# Patient Record
Sex: FEMALE | Race: WHITE | NOT HISPANIC OR LATINO | Employment: FULL TIME | URBAN - NONMETROPOLITAN AREA
[De-identification: names, ages, dates, MRNs, and addresses within clinical notes are randomized per-mention and may not be internally consistent; named-entity substitution may affect disease eponyms.]

---

## 2023-04-17 PROBLEM — F33.1 MODERATE EPISODE OF RECURRENT MAJOR DEPRESSIVE DISORDER (MULTI): Status: ACTIVE | Noted: 2023-04-17

## 2023-04-17 PROBLEM — Z97.5 IUD CONTRACEPTION: Status: ACTIVE | Noted: 2023-04-17

## 2023-04-17 PROBLEM — F12.90 CANNABIS USE DISORDER: Status: ACTIVE | Noted: 2023-04-17

## 2023-04-17 PROBLEM — F41.9 ANXIETY, MILD: Status: ACTIVE | Noted: 2023-04-17

## 2023-04-17 PROBLEM — F32.5 DEPRESSION, MAJOR, IN REMISSION (CMS-HCC): Status: ACTIVE | Noted: 2023-04-17

## 2023-04-17 PROBLEM — F31.9 BIPOLAR DEPRESSION (MULTI): Status: ACTIVE | Noted: 2023-04-17

## 2023-04-17 PROBLEM — E55.9 VITAMIN D DEFICIENCY: Status: ACTIVE | Noted: 2023-04-17

## 2023-04-17 RX ORDER — LITHIUM CARBONATE 150 MG/1
1 CAPSULE ORAL 2 TIMES DAILY
COMMUNITY
Start: 2022-09-28 | End: 2023-11-14 | Stop reason: WASHOUT

## 2023-04-17 RX ORDER — BUPROPION HYDROCHLORIDE 150 MG/1
1 TABLET ORAL 2 TIMES DAILY
COMMUNITY
Start: 2022-09-28 | End: 2023-11-14 | Stop reason: WASHOUT

## 2023-04-17 RX ORDER — TRAZODONE HYDROCHLORIDE 150 MG/1
1 TABLET ORAL NIGHTLY
COMMUNITY
Start: 2022-05-03 | End: 2023-11-14 | Stop reason: WASHOUT

## 2023-04-17 RX ORDER — BUSPIRONE HYDROCHLORIDE 30 MG/1
1 TABLET ORAL 2 TIMES DAILY
COMMUNITY
Start: 2022-09-28

## 2023-04-17 RX ORDER — QUETIAPINE FUMARATE 300 MG/1
1 TABLET, FILM COATED ORAL NIGHTLY
COMMUNITY
Start: 2022-09-28 | End: 2023-11-14 | Stop reason: WASHOUT

## 2023-04-17 RX ORDER — ERGOCALCIFEROL 1.25 MG/1
1 CAPSULE ORAL
COMMUNITY
Start: 2022-09-28

## 2023-04-17 NOTE — PROGRESS NOTES
Subjective:    Nadiya Metz is a 22 y.o. female who presents for No chief complaint on file.        HPI:    Who is GYN?    When was last pap?    Does she still have her IUD?  If yes- when was it placed?        What concern/ problem/pain/symptom  brings you here today?      how long has pt had sxs?      describe symptoms-      Any open lesions/blisters?       how often do symptoms occur?      has pt tried anything for current symptoms, including medications (OTC or prescription)  ?        what makes symptoms worse?      has pt been seen recently for this problem ( within past 2-3 weeks) ?    if yes- where?    by who?    what treatment was provided?            Social History     Tobacco Use   Smoking Status Not on file   Smokeless Tobacco Not on file   Vaping Use   Vaping Status Not on file         Review of Systems:      Objective:    There were no vitals filed for this visit.      Patient is alert and oriented x 3 , NAD  HEAD- normocephalic and atraumatic   EYES-conjunctiva-normal, lids - normal  EARS/NOSE- normal external exam   NECK-supple,FROM  CV- RRR without murmur  PULM- CTA bilaterally, normal respiratory effort  RESPIRATORY EFFORT- normal , no retractions or nasal flaring   ABD- normoactive BS's   EXT- no edema,NT  SKIN- no abnormal skin lesions noted  NEURO- no focal deficits  PSYCH- pleasant, normal judgement and insight          Pt has received Gardisil vaccine   12/2022- chalmydia positive,  repeat testing was neg   1/2020 hx gardnerella /BV      Assessment/Plan:    No diagnosis found.    No orders of the defined types were placed in this encounter.            Call if no better or if symptoms worsen

## 2023-04-18 ENCOUNTER — APPOINTMENT (OUTPATIENT)
Dept: PRIMARY CARE | Facility: CLINIC | Age: 23
End: 2023-04-18
Payer: COMMERCIAL

## 2023-04-18 RX ORDER — LINACLOTIDE 145 UG/1
CAPSULE, GELATIN COATED ORAL
COMMUNITY
Start: 2022-09-06 | End: 2023-11-14 | Stop reason: WASHOUT

## 2023-04-18 RX ORDER — PROPRANOLOL HYDROCHLORIDE 10 MG/1
1 TABLET ORAL 2 TIMES DAILY
COMMUNITY
Start: 2022-08-13

## 2023-04-18 RX ORDER — CLONIDINE HYDROCHLORIDE 0.1 MG/1
TABLET ORAL
COMMUNITY
Start: 2022-09-06

## 2023-04-18 RX ORDER — ARIPIPRAZOLE 10 MG/1
TABLET ORAL
COMMUNITY
Start: 2022-09-13 | End: 2023-11-14 | Stop reason: WASHOUT

## 2023-04-18 RX ORDER — HYDROXYZINE PAMOATE 50 MG/1
CAPSULE ORAL
COMMUNITY
Start: 2022-08-16

## 2023-04-18 RX ORDER — VENLAFAXINE HYDROCHLORIDE 37.5 MG/1
CAPSULE, EXTENDED RELEASE ORAL
COMMUNITY
Start: 2022-08-11 | End: 2023-11-14 | Stop reason: WASHOUT

## 2023-04-18 RX ORDER — QUETIAPINE FUMARATE 100 MG/1
TABLET, FILM COATED ORAL
COMMUNITY
Start: 2022-09-13 | End: 2023-11-14 | Stop reason: WASHOUT

## 2023-04-18 RX ORDER — BUPROPION HYDROCHLORIDE 100 MG/1
TABLET, EXTENDED RELEASE ORAL
COMMUNITY
Start: 2022-08-11 | End: 2023-11-14 | Stop reason: WASHOUT

## 2023-04-18 RX ORDER — BUSPIRONE HYDROCHLORIDE 10 MG/1
1 TABLET ORAL 2 TIMES DAILY
COMMUNITY
Start: 2022-08-18 | End: 2023-11-14 | Stop reason: WASHOUT

## 2023-04-18 RX ORDER — MIRTAZAPINE 15 MG/1
TABLET, FILM COATED ORAL
COMMUNITY
Start: 2022-09-14 | End: 2023-11-14 | Stop reason: WASHOUT

## 2023-04-18 NOTE — PROGRESS NOTES
Subjective:     Nadiya Metz is a 22 y.o. female who presents for No chief complaint on file.           HPI:     Who is GYN?     When was last pap?     Does she still have her IUD?  If yes- when was it placed?           What concern/ problem/pain/symptom  brings you here today?        how long has pt had sxs?        describe symptoms-        Any open lesions/blisters?         how often do symptoms occur?        has pt tried anything for current symptoms, including medications (OTC or prescription)  ?          what makes symptoms worse?        has pt been seen recently for this problem ( within past 2-3 weeks) ?     if yes- where?     by who?     what treatment was provided?                 Social History          Tobacco Use   Smoking Status Not on file   Smokeless Tobacco Not on file   Vaping Use   Vaping Status Not on file            Review of Systems:        Objective:     Vitals   There were no vitals filed for this visit.           Patient is alert and oriented x 3 , NAD  HEAD- normocephalic and atraumatic   EYES-conjunctiva-normal, lids - normal  EARS/NOSE- normal external exam   NECK-supple,FROM  CV- RRR without murmur  PULM- CTA bilaterally, normal respiratory effort  RESPIRATORY EFFORT- normal , no retractions or nasal flaring   ABD- normoactive BS's   EXT- no edema,NT  SKIN- no abnormal skin lesions noted  NEURO- no focal deficits  PSYCH- pleasant, normal judgement and insight              Pt has received Gardisil vaccine   12/2022- chalmydia positive,  repeat testing was neg   1/2020 hx gardnerella /BV        Assessment/Plan:     No diagnosis found.     No orders of the defined types were placed in this encounter.                 Call if no better or if symptoms worsen                         Appointment on 4/18/2023

## 2023-04-20 ENCOUNTER — APPOINTMENT (OUTPATIENT)
Dept: PRIMARY CARE | Facility: CLINIC | Age: 23
End: 2023-04-20
Payer: COMMERCIAL

## 2023-04-28 ENCOUNTER — TELEPHONE (OUTPATIENT)
Dept: PRIMARY CARE | Facility: CLINIC | Age: 23
End: 2023-04-28
Payer: COMMERCIAL

## 2023-04-28 NOTE — TELEPHONE ENCOUNTER
4/28/23    Dear Nadiya,      We are concerned about your recent missed appointments. Your health care is very important to us. Missing appointments or rescheduling on the same day impacts your medical care as the patient as well as the medical care of the other patients.     We ask that you give us at least a 24 hour notice if you need to cancel an appointment so that we can offer that appointment to another patient who needs to see the doctor. We also ask that you arrive prior to your scheduled appointment time. If you continue to cancel the same day or no show for appointments you will be dismissed from the practice.     We strive to provide the best medical care to all our patients and we hope we will be able to continue to serve as your physicians.    Sincerely,      [facility]

## 2023-11-12 PROBLEM — F15.10 METHAMPHETAMINE ABUSE (MULTI): Chronic | Status: ACTIVE | Noted: 2022-08-12

## 2023-11-12 PROBLEM — F41.9 ANXIETY, MILD: Chronic | Status: ACTIVE | Noted: 2023-04-17

## 2023-11-12 PROBLEM — F31.9 BIPOLAR DEPRESSION (MULTI): Chronic | Status: ACTIVE | Noted: 2023-04-17

## 2023-11-12 PROBLEM — F32.5 DEPRESSION, MAJOR, IN REMISSION (CMS-HCC): Chronic | Status: ACTIVE | Noted: 2023-04-17

## 2023-11-12 PROBLEM — F17.200 NICOTINE USE DISORDER: Status: ACTIVE | Noted: 2022-08-12

## 2023-11-12 PROBLEM — F15.10 METHAMPHETAMINE ABUSE (MULTI): Status: ACTIVE | Noted: 2022-08-12

## 2023-11-12 PROBLEM — F31.81 BIPOLAR 2 DISORDER (MULTI): Chronic | Status: ACTIVE | Noted: 2022-08-07

## 2023-11-12 PROBLEM — F33.1 MODERATE EPISODE OF RECURRENT MAJOR DEPRESSIVE DISORDER (MULTI): Chronic | Status: ACTIVE | Noted: 2023-04-17

## 2023-11-12 PROBLEM — F31.81 BIPOLAR 2 DISORDER (MULTI): Status: ACTIVE | Noted: 2022-08-07

## 2023-11-12 PROBLEM — F12.90 CANNABIS USE DISORDER: Chronic | Status: ACTIVE | Noted: 2023-04-17

## 2023-11-12 NOTE — PROGRESS NOTES
Subjective        Nadiya Metz. Is 23 y.o.. female. Here for follow up office visit-       Chief Complaint   Patient presents with    Follow-up     Meds.  Wants referral for medication management w/psych       HPI:        Follow up for vit d deficiency    Is pt still smoking cigarettes?  Yes  How many per day?  2  How many years has she been smoking?  3        Due for lab     Tdap 2022      Pt has had HPV vaccines     Other medical specialists are involved in patient's care.    Any recent notes that were available were reviewed.    All conditions are monitored, evaluated and assessed regularly.    Patient is compliant with current treatment regimen/medications.    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    Pt is drug free at this time         Social History     Tobacco Use   Smoking Status Every Day    Types: Cigarettes   Smokeless Tobacco Never           REVIEW OF SYSTEMS:        Objective      Vitals:    11/14/23 1318   BP: 127/75   BP Location: Left arm   Patient Position: Sitting   BP Cuff Size: Adult   Pulse: 92   Temp: 36.5 °C (97.7 °F)   TempSrc: Temporal   SpO2: 96%   Weight: 70.8 kg (156 lb)       PHYSICAL:      Patient is alert and oriented x 3 , NAD  HEAD- normocephalic and atraumatic   EYES-conjunctiva-normal, lids - normal  EARS/NOSE- normal external exam   NECK-supple,FROM  CV- RRR without murmur  PULM- CTA bilaterally, normal respiratory effort  RESPIRATORY EFFORT- normal , no retractions or nasal  flaring   ABD- normoactive BS's   EXT- no edema,NT  SKIN- no abnormal skin lesions noted  NEURO- no focal deficits  PSYCH- pleasant, normal judgement and insight      BP Readings from Last 3 Encounters:   11/14/23 127/75   02/08/23 108/88   12/30/22 99/66             Wt Readings from Last 3 Encounters:   11/14/23 70.8 kg (156 lb)   02/08/23 63.9 kg (140 lb 14 oz)   12/30/22 68.3 kg (150 lb 8 oz)           BMI Readings from Last 3 Encounters:   11/14/23 22.38 kg/m²   02/08/23 20.21 kg/m²   12/30/22 21.59 kg/m²               The number and complexity of problems addressed is considered moderate.  The amount and/or complexity of data reviewed and analyzed is considered moderate. The risk of complications and/or morbidity/mortality of patient is considered moderate. Overall, this patient encounter is considered a moderate risk visit.          Assessment/Plan      Problem List Items Addressed This Visit          Active Problems    Bipolar 2 disorder (CMS/[facility]) (Chronic)    Relevant Orders    Referral to Psychiatry    Bipolar depression (CMS/[facility]) (Chronic)    Relevant Orders    Referral to Psychiatry    Cannabis use disorder (Chronic)    Relevant Orders    Referral to Psychiatry    Depression, major, in remission (CMS/[facility]) (Chronic)    Relevant Orders    Referral to Psychiatry    Methamphetamine abuse (CMS/[facility]) (Chronic)    Relevant Orders    Referral to Psychiatry    Vitamin D deficiency - Primary    Relevant Orders    Vitamin D 25-Hydroxy,Total (for eval of Vitamin D levels)    Follow Up In [facility] - PCP - Established       Orders Placed This Encounter   Procedures    Vitamin D 25-Hydroxy,Total (for eval of Vitamin D levels)    Referral to Psychiatry         Refer back to Psychiatrist       Continue medication      Ordered lab      Follow up in 6 months

## 2023-11-14 ENCOUNTER — OFFICE VISIT (OUTPATIENT)
Dept: PRIMARY CARE | Facility: CLINIC | Age: 23
End: 2023-11-14
Payer: COMMERCIAL

## 2023-11-14 VITALS
DIASTOLIC BLOOD PRESSURE: 75 MMHG | WEIGHT: 156 LBS | OXYGEN SATURATION: 96 % | HEART RATE: 92 BPM | SYSTOLIC BLOOD PRESSURE: 127 MMHG | BODY MASS INDEX: 22.38 KG/M2 | TEMPERATURE: 97.7 F

## 2023-11-14 DIAGNOSIS — F32.5 DEPRESSION, MAJOR, IN REMISSION (CMS-HCC): Chronic | ICD-10-CM

## 2023-11-14 DIAGNOSIS — F31.9 BIPOLAR DEPRESSION (MULTI): Chronic | ICD-10-CM

## 2023-11-14 DIAGNOSIS — F31.81 BIPOLAR 2 DISORDER (MULTI): Chronic | ICD-10-CM

## 2023-11-14 DIAGNOSIS — E55.9 VITAMIN D DEFICIENCY: Primary | ICD-10-CM

## 2023-11-14 DIAGNOSIS — F12.90 CANNABIS USE DISORDER: Chronic | ICD-10-CM

## 2023-11-14 DIAGNOSIS — F15.10 METHAMPHETAMINE ABUSE (MULTI): Chronic | ICD-10-CM

## 2023-11-14 PROCEDURE — 99213 OFFICE O/P EST LOW 20 MIN: CPT | Performed by: FAMILY MEDICINE

## 2023-11-14 RX ORDER — DIVALPROEX SODIUM 500 MG/1
500 TABLET, DELAYED RELEASE ORAL 2 TIMES DAILY
COMMUNITY
Start: 2023-11-11

## 2023-11-14 RX ORDER — BUPROPION HYDROCHLORIDE 300 MG/1
300 TABLET ORAL DAILY
COMMUNITY

## 2023-11-14 RX ORDER — NALTREXONE HYDROCHLORIDE 50 MG/1
50 TABLET, FILM COATED ORAL NIGHTLY
COMMUNITY
Start: 2023-09-28

## 2023-11-14 RX ORDER — QUETIAPINE FUMARATE 200 MG/1
200 TABLET, FILM COATED ORAL NIGHTLY
COMMUNITY

## 2023-11-14 RX ORDER — BUSPIRONE HYDROCHLORIDE 30 MG/1
30 TABLET ORAL 2 TIMES DAILY
Qty: 60 TABLET | Refills: 0 | Status: CANCELLED | OUTPATIENT
Start: 2023-11-14 | End: 2023-12-14

## 2023-11-14 RX ORDER — NALTREXONE HYDROCHLORIDE 50 MG/1
50 TABLET, FILM COATED ORAL NIGHTLY
Qty: 30 TABLET | Refills: 0 | Status: CANCELLED | OUTPATIENT
Start: 2023-11-14 | End: 2023-12-14

## 2023-11-14 RX ORDER — BUPROPION HYDROCHLORIDE 300 MG/1
300 TABLET ORAL DAILY
Status: CANCELLED | OUTPATIENT
Start: 2023-11-14

## 2024-03-23 ENCOUNTER — OFFICE VISIT (OUTPATIENT)
Dept: URGENT CARE | Facility: CLINIC | Age: 24
End: 2024-03-23
Payer: COMMERCIAL

## 2024-03-23 VITALS
HEIGHT: 70 IN | HEART RATE: 123 BPM | SYSTOLIC BLOOD PRESSURE: 125 MMHG | DIASTOLIC BLOOD PRESSURE: 88 MMHG | OXYGEN SATURATION: 99 % | WEIGHT: 142 LBS | TEMPERATURE: 99.5 F | BODY MASS INDEX: 20.33 KG/M2

## 2024-03-23 DIAGNOSIS — N30.01 ACUTE CYSTITIS WITH HEMATURIA: Primary | ICD-10-CM

## 2024-03-23 LAB
POC APPEARANCE, URINE: CLEAR
POC BILIRUBIN, URINE: NEGATIVE
POC BLOOD, URINE: ABNORMAL
POC COLOR, URINE: YELLOW
POC GLUCOSE, URINE: NEGATIVE MG/DL
POC KETONES, URINE: NEGATIVE MG/DL
POC LEUKOCYTES, URINE: ABNORMAL
POC NITRITE,URINE: POSITIVE
POC PH, URINE: 7 PH
POC PROTEIN, URINE: ABNORMAL MG/DL
POC SPECIFIC GRAVITY, URINE: 1.02
POC UROBILINOGEN, URINE: 0.2 EU/DL

## 2024-03-23 PROCEDURE — 87186 SC STD MICRODIL/AGAR DIL: CPT

## 2024-03-23 PROCEDURE — 99212 OFFICE O/P EST SF 10 MIN: CPT | Performed by: PHYSICIAN ASSISTANT

## 2024-03-23 PROCEDURE — 81002 URINALYSIS NONAUTO W/O SCOPE: CPT | Performed by: PHYSICIAN ASSISTANT

## 2024-03-23 PROCEDURE — 87086 URINE CULTURE/COLONY COUNT: CPT

## 2024-03-23 RX ORDER — CIPROFLOXACIN 250 MG/1
250 TABLET, FILM COATED ORAL 2 TIMES DAILY
Qty: 6 TABLET | Refills: 0 | Status: SHIPPED | OUTPATIENT
Start: 2024-03-23 | End: 2024-03-26

## 2024-03-23 NOTE — PROGRESS NOTES
[facility] TIA NOTE:      Name: Nadiya Metz, 23 y.o.    CSN:[id number]   MRN:[id number]    PCP: Faviola Macias MD    ALL:  No Known Allergies    History:    Chief Complaint: UTI (Lower back pain, fever, Dysuria x 3 days)    Encounter Date: 3/23/2024  14:15hrs    HPI: The history was obtained from the patient. Nadiya is a 23 y.o. female, who presents with a chief complaint of UTI (Lower back pain, fever, Dysuria x 3 days)     Denies any fever, nausea, vomiting and diarrhea.  MA indicates temperature but did not had any recorded note patient has not recorded any at home.      PMHx:    Past Medical History:   Diagnosis Date    Abnormal levels of other serum enzymes 05/18/2016    Elevated alkaline phosphatase level    Acute vaginitis 01/10/2020    Acute vaginitis    Acute vaginitis     Gardnerella vaginitis    Bipolar disorder, currently in remission, most recent episode unspecified (CMS/[facility]) 10/18/2022    History of depressed bipolar disorder    Cellulitis, unspecified 05/08/2014    Cellulitis    Contact with and (suspected) exposure to infections with a predominantly sexual mode of transmission     STD exposure    Contact with and (suspected) exposure to infections with a predominantly sexual mode of transmission 07/31/2019    STD exposure    Contact with and (suspected) exposure to infections with a predominantly sexual mode of transmission 07/23/2020    STD exposure    Encounter for follow-up examination after completed treatment for conditions other than malignant neoplasm 10/19/2021    Hospital discharge follow-up    Encounter for follow-up examination after completed treatment for conditions other than malignant neoplasm 07/15/2019    Hospital discharge follow-up    Encounter for immunization 06/23/2017    Immunization due    Encounter for screening for infections with a predominantly sexual mode of transmission 10/09/2019    Screen for STD (sexually transmitted disease)    Localized  enlarged lymph nodes 02/21/2020    Inguinal adenopathy    Other chest pain 10/30/2015    Chest discomfort    Personal history of diseases of the skin and subcutaneous tissue 02/21/2020    History of folliculitis    Personal history of other diseases of the digestive system 07/16/2018    History of gastroenteritis    Personal history of other diseases of the female genital tract 07/23/2020    History of vaginal discharge    Personal history of other diseases of the respiratory system 10/19/2016    History of sore throat    Personal history of other diseases of the respiratory system 12/01/2019    History of acute sinusitis    Personal history of other diseases of urinary system 10/19/2021    History of hematuria    Personal history of other endocrine, nutritional and metabolic disease 05/06/2021    History of dehydration    Personal history of other infectious and parasitic diseases 07/15/2019    History of candidiasis    Personal history of other mental and behavioral disorders     History of depression    Personal history of other specified conditions 07/16/2018    History of diarrhea    Personal history of other specified conditions 07/16/2018    History of nausea    Personal history of other specified conditions 07/16/2018    History of vomiting    Personal history of other specified conditions 10/19/2016    History of fatigue    Rash and other nonspecific skin eruption 07/23/2020    Rash    Sexually transmitted chlamydial infection of other sites 07/15/2019    Chlamydia trachomatis infection    Swimmer's ear, right ear 06/23/2017    Acute swimmer's ear of right side    Urinary tract infection, site not specified 02/22/2021    Acute UTI              Current Outpatient Medications   Medication Sig Dispense Refill    buPROPion XL (Wellbutrin XL) 300 mg 24 hr tablet Take 1 tablet (300 mg) by mouth once daily. Do not crush, chew, or split.      QUEtiapine (SEROquel) 200 mg tablet Take 1 tablet (200 mg) by mouth once  daily at bedtime.      busPIRone (Buspar) 30 mg tablet Take 1 tablet (30 mg) by mouth 2 times a day.      ciprofloxacin (Cipro) 250 mg tablet Take 1 tablet (250 mg) by mouth 2 times a day for 3 days. 6 tablet 0    cloNIDine (Catapres) 0.1 mg tablet       divalproex (Depakote) 500 mg EC tablet Take 1 tablet (500 mg) by mouth 2 times a day.      ergocalciferol (Vitamin D-2) 1.25 MG (61072 UT) capsule Take 1 capsule (1,250 mcg) by mouth 1 (one) time per week.      hydrOXYzine pamoate (Vistaril) 50 mg capsule TAKE ONE CAPSULE BY MOUTH EVERY 6 HOURS IF NEEDED      naltrexone (Depade) 50 mg tablet Take 1 tablet (50 mg) by mouth once daily at bedtime.      propranolol (Inderal) 10 mg tablet Take 1 tablet (10 mg) by mouth 2 times a day.       No current facility-administered medications for this visit.         PMSx:    Past Surgical History:   Procedure Laterality Date    OTHER SURGICAL HISTORY  07/16/2018    Non-Biodegradable Drug Delivery Implant, Insertion    TONSILLECTOMY  05/08/2014    Tonsillectomy With Adenoidectomy       Fam Hx: No family history on file.    SOC. Hx:     Social History     Socioeconomic History    Marital status: Significant Other     Spouse name: Not on file    Number of children: Not on file    Years of education: Not on file    Highest education level: Not on file   Occupational History    Not on file   Tobacco Use    Smoking status: Every Day     Types: Cigarettes    Smokeless tobacco: Never   Substance and Sexual Activity    Alcohol use: Not Currently    Drug use: Yes     Types: Marijuana     Comment: Several times daily    Sexual activity: Not on file   Other Topics Concern    Not on file   Social History Narrative    Not on file     Social Determinants of Health     Financial Resource Strain: Not on file   Food Insecurity: Not on file   Transportation Needs: Not on file   Physical Activity: Not on file   Stress: Not on file   Social Connections: Not on file   Intimate Partner Violence: Not on  file   Housing Stability: Not on file         Vitals:    03/23/24 1411   BP: 125/88   Pulse: (!) 123   Temp: 37.5 °C (99.5 °F)   SpO2: 99%     64.4 kg (142 lb)          Physical Exam  Constitutional:       Appearance: Normal appearance. She is normal weight.   HENT:      Head: Normocephalic and atraumatic.      Nose: Nose normal.      Mouth/Throat:      Mouth: Mucous membranes are moist.   Eyes:      Extraocular Movements: Extraocular movements intact.   Cardiovascular:      Rate and Rhythm: Normal rate and regular rhythm.   Pulmonary:      Effort: Pulmonary effort is normal.      Breath sounds: Normal breath sounds.   Abdominal:      General: Abdomen is flat.      Palpations: Abdomen is soft. There is no hepatomegaly or splenomegaly.      Tenderness: There is no abdominal tenderness. There is no right CVA tenderness or left CVA tenderness.   Musculoskeletal:         General: Normal range of motion.      Cervical back: Normal range of motion and neck supple.   Skin:     General: Skin is warm.   Neurological:      Mental Status: She is alert and oriented to person, place, and time.   Psychiatric:         Behavior: Behavior normal.           LABORATORY @ RADIOLOGICAL IMAGING (if done):     Results for orders placed or performed in visit on 03/23/24 (from the past 24 hour(s))   POCT UA (nonautomated w/o microscopy) manually resulted   Result Value Ref Range    POC Color, Urine Yellow Straw, Yellow, Light-Yellow    POC Appearance, Urine Clear Clear    POC Glucose, Urine NEGATIVE NEGATIVE mg/dl    POC Bilirubin, Urine NEGATIVE NEGATIVE    POC Ketones, Urine NEGATIVE NEGATIVE mg/dl    POC Specific Gravity, Urine 1.025 1.005 - 1.035    POC Blood, Urine SMALL (1+) (A) NEGATIVE    POC PH, Urine 7.0 No Reference Range Established PH    POC Protein, Urine 30 (1+) NEGATIVE, 30 (1+) mg/dl    POC Urobilinogen, Urine 0.2 0.2, 1.0 EU/DL    Poc Nitrite, Urine POSITIVE (A) NEGATIVE    POC Leukocytes, Urine MODERATE (2+) (A) NEGATIVE        ____________________________________________________________________    I did personally review Nadiya's past medical history, surgical history, social history, as well as family history (when relevant).  In this case, I also oversaw the her drug management by reviewing her medication list, allergy list, as well as the medications that I prescribed during the UC course and/or recommended as an out-patient (including possible OTC medications such as acetaminophen, NSAIDs , etc).    After reviewing the items above, I did look at previous medical documentation, such as recent hospitalizations, office visits, and/or recent consultations with PCP/specialist.                          SDOH:   Another factor that I considered in Nadiya's care was her Social Determinants of Health (SDOH). During this UC encounter, she did not have social determinants of health. Those SDOH influencing Nadiya's care are: none      _____________________________________________________________________      UC COURSE/MEDICAL DECISION MAKING:    Nadiya is a 23 y.o., who presents with a working diagnosis of   1. Acute cystitis with hematuria     with a differential to include: Cystitis, pyelo-, interstitial cystitis, STI    Nitrate positive urine will be sent for culture and sensitivity, will initiate Cipro for 3 days and notify of results when available.  Medications can always be extended if necessary, should be given only note for work and discharged with significant other.      Luca Comer PA-C   Advanced Practice Provider  [facility]

## 2024-03-23 NOTE — LETTER
March 23, 2024     Patient: Nadiya Metz   YOB: 2000   Date of Visit: 3/23/2024       To Whom It May Concern:    It is my medical opinion that Nadiya Metz may return to work on 03/24/24 .    If you have any questions or concerns, please don't hesitate to call.         Sincerely,        Luca Comer PA-C    CC: No Recipients

## 2024-03-26 ENCOUNTER — TELEPHONE (OUTPATIENT)
Dept: URGENT CARE | Facility: CLINIC | Age: 24
End: 2024-03-26
Payer: COMMERCIAL

## 2024-03-26 ENCOUNTER — DOCUMENTATION (OUTPATIENT)
Dept: PRIMARY CARE | Facility: CLINIC | Age: 24
End: 2024-03-26
Payer: COMMERCIAL

## 2024-03-26 LAB — BACTERIA UR CULT: ABNORMAL

## 2024-03-26 NOTE — TELEPHONE ENCOUNTER
[facility] Telephone NOTE:    Name: Nadiya Metz, 23 y.o.    CSN:[id number]   MRN:[id number]    PCP: Faviola Macias MD  ALL:  No Known Allergies      Date of call: 03/26/24  Time of Call: 12:29 PM    Connection was: not made          Purpose:  I left a message on answering machine    Details:   Request return call

## 2024-03-27 ENCOUNTER — PATIENT OUTREACH (OUTPATIENT)
Dept: CARE COORDINATION | Facility: CLINIC | Age: 24
End: 2024-03-27
Payer: COMMERCIAL

## 2024-03-27 ENCOUNTER — TELEPHONE (OUTPATIENT)
Dept: PRIMARY CARE | Facility: CLINIC | Age: 24
End: 2024-03-27
Payer: COMMERCIAL

## 2024-03-27 NOTE — PROGRESS NOTES
Discharge Facility:[facility]  Discharge Diagnosis:Pyelonephritis  Admission Date:3/24/2024  Discharge Date: 3/25/2024    PCP Appointment Date:TBD  Specialist Appointment Date: N/A  Hospital Encounter and Summary: not available at this time

## 2024-03-27 NOTE — TELEPHONE ENCOUNTER
----- Message from Antonella Mas MA sent at 3/27/2024  2:13 PM EDT -----  Regarding: TCM No contact  This patient was discharged from: [facility]  Discharge diagnosis:   Pyelonephritis   Date of discharge:  3/25/2024       No PCP appointments available within 14 days of discharge. Unable to contact patient despite outreach attempts x 2 made.  Please reach out to patient and schedule an appointment by: 4/8/2024.    Thank You!

## 2024-04-03 NOTE — TELEPHONE ENCOUNTER
Please advise, we have not been able to get hold of patient.   She is scheduled with you on 05/02

## 2024-04-03 NOTE — TELEPHONE ENCOUNTER
Reviewed- Blood cxs are neg,  urine culture positve and bacteria sensitiveto cephalopsporins - so at this time - so at this time  pt has been appropriately treated

## 2024-04-11 ENCOUNTER — PATIENT OUTREACH (OUTPATIENT)
Dept: CARE COORDINATION | Facility: CLINIC | Age: 24
End: 2024-04-11
Payer: COMMERCIAL

## 2024-04-11 NOTE — PROGRESS NOTES
Unable to reach patient for call back after patient's follow up appointment with PCP.   RENEM with call back number for patient to call if needed   If no voicemail available call attempts x 2 were made to contact the patient to assist with any questions or concerns patient may have.

## 2024-05-15 PROBLEM — N12 PYELONEPHRITIS: Status: ACTIVE | Noted: 2024-03-24

## 2024-05-21 ENCOUNTER — TELEPHONE (OUTPATIENT)
Dept: PRIMARY CARE | Facility: CLINIC | Age: 24
End: 2024-05-21
Payer: COMMERCIAL

## 2024-05-21 NOTE — TELEPHONE ENCOUNTER
Pt was a no show to her appointment on 5/20/24. This is pt's 3rd no show. A 2nd show letter was sent to pt on 4/28/24. After speaking with Dr. Macias she asked that another no show letter be sent to the pt. She would like to stress that the pt needs to keep all follow up appointments.